# Patient Record
(demographics unavailable — no encounter records)

---

## 2020-09-08 NOTE — NUR
SEE MERGE  DOCUMENTATION  FOR  MEDICATION ADMINISTRATION AND  INTRA/POST
PROCEDURE  SEDATION ASSESSMETNS.

## 2022-09-07 NOTE — NUR
DC instructions reviewed with pt, she expresses understanding. INT
DC'd with catheter intact, and bleeding controlled at site. Air removed from
TR band in 2-3ml increments, pt tolerate well and no bleeding at site.
Radial puncture site covered with 2x2 and bandaid. Rt groin puncture site
remains soft to palpation, and dressing is clean, dry and intact. Pt is
assisted out by wheelchair to 's car. Thoracentesis